# Patient Record
Sex: MALE | Race: WHITE | NOT HISPANIC OR LATINO | Employment: FULL TIME | URBAN - METROPOLITAN AREA
[De-identification: names, ages, dates, MRNs, and addresses within clinical notes are randomized per-mention and may not be internally consistent; named-entity substitution may affect disease eponyms.]

---

## 2022-05-12 ENCOUNTER — APPOINTMENT (EMERGENCY)
Dept: CT IMAGING | Facility: HOSPITAL | Age: 42
End: 2022-05-12
Payer: COMMERCIAL

## 2022-05-12 ENCOUNTER — TELEPHONE (OUTPATIENT)
Dept: UROLOGY | Facility: MEDICAL CENTER | Age: 42
End: 2022-05-12

## 2022-05-12 ENCOUNTER — HOSPITAL ENCOUNTER (EMERGENCY)
Facility: HOSPITAL | Age: 42
Discharge: HOME | End: 2022-05-12
Attending: EMERGENCY MEDICINE
Payer: COMMERCIAL

## 2022-05-12 VITALS
BODY MASS INDEX: 25.92 KG/M2 | OXYGEN SATURATION: 98 % | TEMPERATURE: 98.2 F | RESPIRATION RATE: 18 BRPM | DIASTOLIC BLOOD PRESSURE: 90 MMHG | SYSTOLIC BLOOD PRESSURE: 162 MMHG | HEIGHT: 69 IN | HEART RATE: 72 BPM | WEIGHT: 175 LBS

## 2022-05-12 DIAGNOSIS — R93.89 ABNORMAL CT SCAN: ICD-10-CM

## 2022-05-12 DIAGNOSIS — R10.32 LEFT GROIN PAIN: Primary | ICD-10-CM

## 2022-05-12 LAB
BILIRUB UR QL STRIP: NEGATIVE
CLARITY UR: CLEAR
COLOR UR: YELLOW
GLUCOSE UR STRIP-MCNC: NEGATIVE MG/DL
HGB UR QL STRIP.AUTO: NEGATIVE
KETONES UR STRIP-MCNC: NEGATIVE MG/DL
LEUKOCYTE ESTERASE UR QL STRIP: NEGATIVE
NITRITE UR QL STRIP: NEGATIVE
PH UR STRIP.AUTO: 6 [PH]
PROT UR STRIP-MCNC: NEGATIVE MG/DL
SP GR UR STRIP.AUTO: 1.01 (ref 1–1.03)
UROBILINOGEN UR QL STRIP.AUTO: 0.2 E.U./DL

## 2022-05-12 PROCEDURE — 87491 CHLMYD TRACH DNA AMP PROBE: CPT | Performed by: PHYSICIAN ASSISTANT

## 2022-05-12 PROCEDURE — 93005 ELECTROCARDIOGRAM TRACING: CPT

## 2022-05-12 PROCEDURE — 99284 EMERGENCY DEPT VISIT MOD MDM: CPT

## 2022-05-12 PROCEDURE — 81003 URINALYSIS AUTO W/O SCOPE: CPT | Performed by: PHYSICIAN ASSISTANT

## 2022-05-12 PROCEDURE — G1004 CDSM NDSC: HCPCS

## 2022-05-12 PROCEDURE — 87591 N.GONORRHOEAE DNA AMP PROB: CPT | Performed by: PHYSICIAN ASSISTANT

## 2022-05-12 PROCEDURE — 99282 EMERGENCY DEPT VISIT SF MDM: CPT | Performed by: PHYSICIAN ASSISTANT

## 2022-05-12 PROCEDURE — 74176 CT ABD & PELVIS W/O CONTRAST: CPT

## 2022-05-12 NOTE — TELEPHONE ENCOUNTER
Please Triage -   New Patient- [facility]    What is the reason for the patients appointment? Patient stated he had blood in semen last Thursday and he was in the ER today with left groin pain   He was referred to see Urology  Patient is having discomfort now   Pain is not as strong  Imaging/Lab Results:in epic       Do we accept the patient's insurance or is the patient Self-Pay? Provider & Plan: [place]   Member ID#: Has the patient had any previous urologist(s)?no        Have patient records been requested?in epic        Has the patient had any outside testing done?in [facility]       Does the patient have a personal history of cancer?   No     Patient can be reached at :[phone]

## 2022-05-12 NOTE — ED PROVIDER NOTES
History  Chief Complaint   Patient presents with    Groin Pain     Pt to er with groin pain and swelling that started last week after "vigorous sex" and blood at the meatus  Patient is a 39 y/o M that presents to the ED with left groin pain  He states last week after having sexual intercourse he noticed blood at the urethral meatus  He has not had blood since then, but has had pain in left groin  He states he noticed a bulge there last night and is concerned he might have a hernia  He denies dysuria  Sitting makes the pain worse  No fevers, chills  Denies risk of STI  History provided by:  Patient  Groin Pain  Presenting symptoms: scrotal pain    Presenting symptoms: no dysuria, no penile discharge, no penile pain and no swelling    Context: after intercourse    Relieved by: lying down  Exacerbated by: sitting up  Associated symptoms: abdominal pain, flank pain, groin pain and hematuria    Associated symptoms: no diarrhea, no fever, no genital itching, no genital lesions, no genital rash, no nausea, no penile redness, no penile swelling, no scrotal swelling, no urinary frequency, no urinary hesitation, no urinary incontinence, no urinary retention and no vomiting    Risk factors: no new sexual partner and no STI exposure        None       History reviewed  No pertinent past medical history  History reviewed  No pertinent surgical history  History reviewed  No pertinent family history  I have reviewed and agree with the history as documented  E-Cigarette/Vaping     E-Cigarette/Vaping Substances     Social History     Tobacco Use    Smoking status: Never Smoker    Smokeless tobacco: Never Used   Substance Use Topics    Alcohol use: Never    Drug use: Never       Review of Systems   Constitutional: Negative for chills and fever  Gastrointestinal: Positive for abdominal pain  Negative for blood in stool, diarrhea, nausea and vomiting     Genitourinary: Positive for flank pain and hematuria  Negative for bladder incontinence, decreased urine volume, difficulty urinating, dysuria, frequency, genital sores, hesitancy, penile discharge, penile pain, penile swelling, scrotal swelling and testicular pain  Musculoskeletal: Negative for back pain and neck pain  Skin: Negative for color change, pallor and rash  Neurological: Negative for dizziness, weakness and light-headedness  All other systems reviewed and are negative  Physical Exam  Physical Exam  Vitals and nursing note reviewed  Exam conducted with a chaperone present Wanda LOAIZA)  Constitutional:       General: He is not in acute distress  Appearance: Normal appearance  He is well-developed, well-groomed and normal weight  He is not ill-appearing or diaphoretic  HENT:      Head: Normocephalic and atraumatic  Right Ear: External ear normal       Nose: Nose normal    Eyes:      Conjunctiva/sclera: Conjunctivae normal    Cardiovascular:      Rate and Rhythm: Normal rate and regular rhythm  Heart sounds: Normal heart sounds  Pulmonary:      Effort: Pulmonary effort is normal       Breath sounds: Normal breath sounds  No wheezing, rhonchi or rales  Abdominal:      General: Abdomen is flat  Bowel sounds are normal       Palpations: Abdomen is soft  Tenderness: There is abdominal tenderness in the left lower quadrant  There is no guarding or rebound  Hernia: There is no hernia in the left inguinal area or right inguinal area  Genitourinary:     Pubic Area: No rash  Penis: Normal and circumcised  No erythema, tenderness, discharge, swelling or lesions  Testes: Normal          Right: Tenderness, swelling or testicular hydrocele not present  Left: Tenderness, swelling or testicular hydrocele not present  Epididymis:      Left: Normal    Musculoskeletal:         General: Normal range of motion  Cervical back: Normal range of motion  Right lower leg: No edema        Left lower leg: No edema  Lymphadenopathy:      Lower Body: No right inguinal adenopathy  No left inguinal adenopathy  Skin:     General: Skin is warm and dry  Coloration: Skin is not jaundiced or pale  Findings: No rash  Neurological:      General: No focal deficit present  Mental Status: He is alert and oriented to person, place, and time  Motor: No weakness  Psychiatric:         Mood and Affect: Mood normal          Behavior: Behavior is cooperative  Vital Signs  ED Triage Vitals   Temperature Pulse Respirations Blood Pressure SpO2   05/12/22 0945 05/12/22 0951 05/12/22 0951 05/12/22 0951 05/12/22 0952   98 2 °F (36 8 °C) 72 18 162/90 98 %      Temp Source Heart Rate Source Patient Position - Orthostatic VS BP Location FiO2 (%)   05/12/22 0945 05/12/22 0951 -- -- --   Temporal Monitor         Pain Score       --                  Vitals:    05/12/22 0951   BP: 162/90   Pulse: 72         Visual Acuity      ED Medications  Medications - No data to display    Diagnostic Studies  Results Reviewed     Procedure Component Value Units Date/Time    UA w Reflex to Microscopic w Reflex to Culture [[id number]] Collected: 05/12/22 0951    Lab Status: Final result Specimen: Urine, Clean Catch Updated: 05/12/22 1011     Color, UA Yellow     Clarity, UA Clear     Specific Gravity, UA 1 015     pH, UA 6 0     Leukocytes, UA Negative     Nitrite, UA Negative     Protein, UA Negative mg/dl      Glucose, UA Negative mg/dl      Ketones, UA Negative mg/dl      Urobilinogen, UA 0 2 E U /dl      Bilirubin, UA Negative     Blood, UA Negative    Chlamydia/GC amplified DNA by PCR [[id number]] Collected: 05/12/22 0951    Lab Status: In process Specimen: Urine, Other Updated: 05/12/22 1005                 CT renal stone study abdomen pelvis without contrast   Final Result by Jarrett Polo MD (05/12 1025)      No acute pathology  Specifically, no urinary tract calculi or obstructive uropathy  Workstation performed: [id number]                    Procedures  Procedures         ED Course                                             MDM  Number of Diagnoses or Management Options  Abnormal CT scan: new and requires workup  Left groin pain: new and requires workup  Diagnosis management comments: Patient with left groin pain, no hernia, no redness, no discharge from penis  Will check urine to r/o UTI  Denies risk of STI, GC/chlamydia pending  Patient found to have calcifications in prostate, advised f/u with urology  Patient with sclerosis right acetabulum, advised f/u with PCP for MRI to further evaluate  No concern for epididymitis, no tenderness  Patient instructed to f/u concern for hernia  Amount and/or Complexity of Data Reviewed  Clinical lab tests: ordered and reviewed  Tests in the radiology section of CPT®: ordered and reviewed    Patient Progress  Patient progress: stable      Disposition  Final diagnoses:   Left groin pain   Abnormal CT scan - calcification in prostate, sclerosis right acetabulum  Time reflects when diagnosis was documented in both MDM as applicable and the Disposition within this note     Time User Action Codes Description Comment    5/12/2022 10:43 AM Minesh Up Add [R10 32] Left groin pain     5/12/2022 10:44 AM Minesh Up Add [R93 89] Abnormal CT scan     5/12/2022 10:44 AM Minesh Blander Modify [R93 89] Abnormal CT scan calcification in prostate    5/12/2022 10:44 AM Minesh Blander Modify [R93 89] Abnormal CT scan calcification in prostate, sclerosis right acetabulum  ED Disposition     ED Disposition   Discharge    Condition   Stable    Date/Time   Thu May 12, 2022 10:31 AM    Comment   Donnell Gentle discharge to home/self care                 Follow-up Information     Follow up With Specialties Details Why Contact Info Additional Information    your family doctor   for MRI right hip      [facility] Call  for calcifications in prostate [address] [address] [address] [facility] [id number] [phone] [facility] Leyda Diaz , [facility] [address], [place], [place] [address]          There are no discharge medications for this patient  No discharge procedures on file      PDMP Review     None          ED Provider  Electronically Signed by           Portillo Ramírez PA-C  05/12/22 9431

## 2022-05-12 NOTE — TELEPHONE ENCOUNTER
Called Berny back and explained not to worry of blood in sperm  Can be caused from a broken blood vessel during ejaculation or  May have an infections   Scheduled him for an appointment on 5/23 in [facility]

## 2022-05-13 LAB
ATRIAL RATE: 72 BPM
P AXIS: 2 DEGREES
PR INTERVAL: 140 MS
QRS AXIS: 69 DEGREES
QRSD INTERVAL: 96 MS
QT INTERVAL: 374 MS
QTC INTERVAL: 409 MS
T WAVE AXIS: 45 DEGREES
VENTRICULAR RATE: 72 BPM

## 2022-05-13 PROCEDURE — 93010 ELECTROCARDIOGRAM REPORT: CPT | Performed by: INTERNAL MEDICINE

## 2022-05-14 LAB
C TRACH DNA SPEC QL NAA+PROBE: NEGATIVE
N GONORRHOEA DNA SPEC QL NAA+PROBE: NEGATIVE

## 2022-05-18 ENCOUNTER — OFFICE VISIT (OUTPATIENT)
Dept: FAMILY MEDICINE CLINIC | Facility: CLINIC | Age: 42
End: 2022-05-18
Payer: COMMERCIAL

## 2022-05-18 VITALS
HEART RATE: 74 BPM | WEIGHT: 179 LBS | OXYGEN SATURATION: 97 % | BODY MASS INDEX: 27.13 KG/M2 | SYSTOLIC BLOOD PRESSURE: 134 MMHG | HEIGHT: 68 IN | DIASTOLIC BLOOD PRESSURE: 88 MMHG

## 2022-05-18 DIAGNOSIS — Z00.00 WELLNESS EXAMINATION: ICD-10-CM

## 2022-05-18 DIAGNOSIS — E78.2 MIXED HYPERLIPIDEMIA: Primary | ICD-10-CM

## 2022-05-18 DIAGNOSIS — L30.8 OTHER ECZEMA: ICD-10-CM

## 2022-05-18 PROCEDURE — 99386 PREV VISIT NEW AGE 40-64: CPT | Performed by: FAMILY MEDICINE

## 2022-05-18 PROCEDURE — 3725F SCREEN DEPRESSION PERFORMED: CPT | Performed by: FAMILY MEDICINE

## 2022-05-18 PROCEDURE — 3008F BODY MASS INDEX DOCD: CPT | Performed by: FAMILY MEDICINE

## 2022-05-18 RX ORDER — TRIAMCINOLONE ACETONIDE 5 MG/G
OINTMENT TOPICAL 2 TIMES DAILY
Qty: 30 G | Refills: 5 | Status: SHIPPED | OUTPATIENT
Start: 2022-05-18

## 2022-05-18 NOTE — PROGRESS NOTES
HPI:  Anny Prasad is a 38 y o  male here for his yearly health maintenance exam    There is no problem list on file for this patient  History reviewed  No pertinent past medical history  1  Advanced Directive: n     2  Durable Power of  for Healthcare: n     3  Social History:           Drug and alcohol History: n                  4  Immunizations up to date: y                 Lifestyle:                           Healthy Diet:y                          Alcohol Use:n                          Tobacco Use:y                          Regular exercise:y                          Weight concerns:n                               5  Over the past 2 weeks, how often have you been bothered by the following:              Little interest or pleasure in doing things:n              Felling down, depressed or hopeless:n       No current outpatient medications on file  No current facility-administered medications for this visit  Allergies   Allergen Reactions    Tylenol [Acetaminophen] Anaphylaxis     Immunization History   Administered Date(s) Administered    INFLUENZA 03/06/2017, 11/14/2017, 10/06/2021       Patient Care Team:  Rafal Stacy MD as PCP - General (Family Medicine)    Review of Systems   Constitutional: Negative for fatigue, fever and unexpected weight change  HENT: Negative for congestion, sinus pain and sore throat  Eyes: Negative for visual disturbance  Respiratory: Negative for shortness of breath and wheezing  Cardiovascular: Negative for chest pain and palpitations  Gastrointestinal: Negative for abdominal pain, nausea and vomiting  Musculoskeletal: Negative  Negative for arthralgias and myalgias  Neurological: Negative for syncope, weakness and numbness  Psychiatric/Behavioral: Negative  Negative for confusion, dysphoric mood and suicidal ideas  Physical Exam :  Physical Exam  Constitutional:       Appearance: He is well-developed     HENT:      Right Ear: Ear canal normal  Tympanic membrane is not injected  Left Ear: Ear canal normal  Tympanic membrane is not injected  Nose: Nose normal    Eyes:      General:         Right eye: No discharge  Left eye: No discharge  Conjunctiva/sclera: Conjunctivae normal       Pupils: Pupils are equal, round, and reactive to light  Neck:      Thyroid: No thyromegaly  Cardiovascular:      Rate and Rhythm: Normal rate and regular rhythm  Heart sounds: Normal heart sounds  No murmur heard  Pulmonary:      Effort: Pulmonary effort is normal  No respiratory distress  Breath sounds: Normal breath sounds  No wheezing  Abdominal:      General: Bowel sounds are normal  There is no distension  Palpations: Abdomen is soft  Tenderness: There is no abdominal tenderness  Musculoskeletal:         General: Normal range of motion  Cervical back: Normal range of motion and neck supple  Lymphadenopathy:      Cervical: No cervical adenopathy  Skin:     General: Skin is warm and dry  Neurological:      Mental Status: He is alert and oriented to person, place, and time  He is not disoriented  Sensory: No sensory deficit  Gait: Gait normal       Deep Tendon Reflexes: Reflexes are normal and symmetric  Psychiatric:         Speech: Speech normal          Behavior: Behavior normal          Thought Content: Thought content normal          Judgment: Judgment normal            Assessment and Plan:  1  Mixed hyperlipidemia  Comprehensive metabolic panel    Lipid Panel with Direct LDL reflex    Comprehensive metabolic panel    Lipid Panel with Direct LDL reflex   2   Wellness examination         Health Maintenance Due   Topic Date Due    Hepatitis C Screening  Never done    COVID-19 Vaccine (1) Never done    Pneumococcal Vaccine: Pediatrics (0 to 5 Years) and At-Risk Patients (6 to 59 Years) (1 - PCV) Never done    HIV Screening  Never done    BMI: Followup Plan  Never done   Kyung Barahona DTaP,Tdap,and Td Vaccines (1 - Tdap) Never done

## 2022-05-23 ENCOUNTER — TELEMEDICINE (OUTPATIENT)
Dept: UROLOGY | Facility: HOSPITAL | Age: 42
End: 2022-05-23
Payer: COMMERCIAL

## 2022-05-23 DIAGNOSIS — Z12.5 SCREENING FOR PROSTATE CANCER: ICD-10-CM

## 2022-05-23 DIAGNOSIS — R36.1 HEMATOSPERMIA: Primary | ICD-10-CM

## 2022-05-23 DIAGNOSIS — R10.32 LEFT GROIN PAIN: ICD-10-CM

## 2022-05-23 PROCEDURE — 99442 PR PHYS/QHP TELEPHONE EVALUATION 11-20 MIN: CPT | Performed by: NURSE PRACTITIONER

## 2022-05-23 NOTE — PROGRESS NOTES
05/23/22    Feliz Cordova   1980   [id number]     Virtual Brief Visit    Assessment  1 Hematospermia  2 Left groin pain     Discussion/Plan  1 Hematospermia   2 Left groin pain    NSAIDs, conservative management reviewed   Scrotal/groin US ordered   PSA ordered     Follow up 4 weeks with imaging  All questions answered  Emotional support provided     Subjective  HPI   Feliz Cordova is a 38year old male who presents in consultation for evaluation of hematospermia and left groin discomfort  He reports an isolated incident of bright red blood in his semen 2 weeks ago  He denies symptoms of prostatitis or UTI  He denies relevant surgical history  He denies painful ejaculation although he states his anxiety surrounding the issue was high and he cannot recall if he experienced pain  He is a smoker approximately 1 ppd x 20-25 years  HE had CT imaging for pain which was negative  GC/Chlamydia and urine studies negative  He hydrates with 24 oz water daily  Denies family history of  malignancy  Review of Systems - History obtained from chart review and the patient  General ROS: negative  Psychological ROS: negative  Endocrine ROS: negative  Respiratory ROS: no cough, shortness of breath, or wheezing  Cardiovascular ROS: no chest pain or dyspnea on exertion  Gastrointestinal ROS: no abdominal pain, change in bowel habits, or black or bloody stools  Genito-Urinary ROS: negative  Musculoskeletal ROS: positive for - pain in groin  Neurological ROS: no TIA or stroke symptoms  Dermatological ROS: negative            JOHN Maher     I have spent 15 minutes with Patient  today in which greater than 50% of this time was spent in counseling/coordination of care regarding Diagnostic results, Risks and benefits of tx options and Intructions for management         Patient is located in the following state in which I hold an active license [place]        Recent Visits  Date Type Provider Dept   05/18/22 Office Visit Aracelis Mendiola Lily Sargent MD [facility]   Showing recent visits within past 7 days and meeting all other requirements  Today's Visits  Date Type Provider Dept   05/23/22 Lance JOHN Ferreira [facility] [place]   Showing today's visits and meeting all other requirements  Future Appointments  No visits were found meeting these conditions    Showing future appointments within next 150 days and meeting all other requirements

## 2022-06-02 ENCOUNTER — HOSPITAL ENCOUNTER (OUTPATIENT)
Dept: ULTRASOUND IMAGING | Facility: HOSPITAL | Age: 42
Discharge: HOME | End: 2022-06-02
Payer: COMMERCIAL

## 2022-06-02 DIAGNOSIS — R10.32 LEFT GROIN PAIN: ICD-10-CM

## 2022-06-02 PROCEDURE — 76870 US EXAM SCROTUM: CPT

## 2022-07-22 ENCOUNTER — TELEPHONE (OUTPATIENT)
Dept: UROLOGY | Facility: AMBULATORY SURGERY CENTER | Age: 42
End: 2022-07-22

## 2022-07-22 NOTE — TELEPHONE ENCOUNTER
PSA needs to sent to [address]  He is going Monday to have his labs drawn  Please call pt back to confirm

## 2022-07-22 NOTE — TELEPHONE ENCOUNTER
Spoke with pt's wife Cielo Cotton and advised her of PSA test order for upcoming appt on 7/26/22  She acknowledged they are aware, however with pt's work schedule it has been difficult making time  Advised her he can do it non-fasting  She acknowledged and said they would try to get it done no later than the day before, 7/25/22

## 2022-07-26 ENCOUNTER — OFFICE VISIT (OUTPATIENT)
Dept: UROLOGY | Facility: HOSPITAL | Age: 42
End: 2022-07-26
Payer: COMMERCIAL

## 2022-07-26 VITALS
HEART RATE: 80 BPM | BODY MASS INDEX: 27.52 KG/M2 | DIASTOLIC BLOOD PRESSURE: 70 MMHG | SYSTOLIC BLOOD PRESSURE: 106 MMHG | OXYGEN SATURATION: 96 % | WEIGHT: 181 LBS

## 2022-07-26 DIAGNOSIS — R36.1 HEMATOSPERMIA: ICD-10-CM

## 2022-07-26 DIAGNOSIS — R10.32 LEFT GROIN PAIN: Primary | ICD-10-CM

## 2022-07-26 LAB
ALBUMIN SERPL-MCNC: 5.3 G/DL (ref 4–5)
ALBUMIN/GLOB SERPL: 3.1 {RATIO} (ref 1.2–2.2)
ALP SERPL-CCNC: 65 IU/L (ref 44–121)
ALT SERPL-CCNC: 31 IU/L (ref 0–44)
AST SERPL-CCNC: 23 IU/L (ref 0–40)
BILIRUB SERPL-MCNC: 0.6 MG/DL (ref 0–1.2)
BUN SERPL-MCNC: 15 MG/DL (ref 6–24)
BUN/CREAT SERPL: 16 (ref 9–20)
CALCIUM SERPL-MCNC: 9.8 MG/DL (ref 8.7–10.2)
CHLORIDE SERPL-SCNC: 102 MMOL/L (ref 96–106)
CHOLEST SERPL-MCNC: 261 MG/DL (ref 100–199)
CO2 SERPL-SCNC: 24 MMOL/L (ref 20–29)
CREAT SERPL-MCNC: 0.92 MG/DL (ref 0.76–1.27)
EGFR: 105 ML/MIN/1.73
GLOBULIN SER-MCNC: 1.7 G/DL (ref 1.5–4.5)
GLUCOSE SERPL-MCNC: 97 MG/DL (ref 65–99)
HDLC SERPL-MCNC: 70 MG/DL
LDLC SERPL CALC-MCNC: 168 MG/DL (ref 0–99)
LDLC/HDLC SERPL: 2.4 RATIO (ref 0–3.6)
POTASSIUM SERPL-SCNC: 4.5 MMOL/L (ref 3.5–5.2)
PROT SERPL-MCNC: 7 G/DL (ref 6–8.5)
PSA SERPL-MCNC: 1 NG/ML (ref 0–4)
SL AMB VLDL CHOLESTEROL CALC: 23 MG/DL (ref 5–40)
SODIUM SERPL-SCNC: 138 MMOL/L (ref 134–144)
TRIGL SERPL-MCNC: 132 MG/DL (ref 0–149)

## 2022-07-26 PROCEDURE — 99213 OFFICE O/P EST LOW 20 MIN: CPT | Performed by: NURSE PRACTITIONER

## 2022-07-26 NOTE — PROGRESS NOTES
07/26/22    Jacqui Padilla   1980   [id number]     Assessment  1 Hematospermia  2 Left groin pain     Discussion/Plan  1 Hematospermia   Resolved  2 Left groin pain               NSAIDs, conservative management reviewed              6/2/22 Scrotal/groin US: Left-sided varicocele  Normal testes       We reviewed imaging  Follow up as needed  All questions answered  Subjective  HPI   Jacqui Padilla is a 38year old male who presents in follow up for evaluation of hematospermia and left groin discomfort  His ultrasound showed left sided varicocele  He reports an isolated incident of bright red blood in his semen last month  This has since resolved and not recurred  He denies symptoms of prostatitis or UTI  He denies relevant surgical history  He is a smoker approximately 1 ppd x 20-25 years  He had CT imaging for pain which was negative  GC/Chlamydia and urine studies negative  He hydrates with 24 oz water daily  Denies family history of  malignancy  Component      Latest Ref Rng & Units 7/25/2022          11:01 AM   PSA, Total      0 0 - 4 0 ng/mL 1 0       Review of Systems - History obtained from chart review and the patient  General ROS: negative  Psychological ROS: negative  Cardiovascular ROS: no chest pain or dyspnea on exertion  Gastrointestinal ROS: negative  Genito-Urinary ROS: no dysuria, trouble voiding, or hematuria  Musculoskeletal ROS: negative  Neurological ROS: no TIA or stroke symptoms  Dermatological ROS: negative       Objective  Physical Exam  Vitals and nursing note reviewed  Constitutional:       General: He is awake  He is not in acute distress  Appearance: Normal appearance  He is well-developed, well-groomed and normal weight  He is not ill-appearing, toxic-appearing or diaphoretic  Pulmonary:      Effort: Pulmonary effort is normal    Abdominal:      Tenderness: There is no right CVA tenderness or left CVA tenderness     Musculoskeletal:         General: Normal range of motion  Cervical back: Normal range of motion and neck supple  Skin:     General: Skin is warm  Neurological:      General: No focal deficit present  Mental Status: He is alert and oriented to person, place, and time  Mental status is at baseline  Psychiatric:         Attention and Perception: Attention normal          Mood and Affect: Mood normal          Speech: Speech normal          Behavior: Behavior normal  Behavior is cooperative  Thought Content: Thought content normal          Cognition and Memory: Cognition normal          Judgment: Judgment normal          SCROTAL ULTRASOUND     INDICATION:    R10 32: Left lower quadrant pain      COMPARISON:  CT renal stone study 5/12/2022     TECHNIQUE:   Ultrasound the scrotal contents was performed with a high frequency linear transducer utilizing volumetric sweep imaging as well as standard still image techniques  Imaging performed in longitudinal and transverse orientation  Color and   spectral Doppler evaluation also performed bilaterally      FINDINGS:     TESTES:   Testes are symmetric and normal in size      RIGHT testis = 5 2 x 2 9 x 2 9 cm  Volume 22 9 mL  Normal contour with homogeneous smooth echotexture  No intratesticular mass lesion or calcifications      LEFT testis = 4 4 x 2 7 x 3 4 cm  Volume 21 0 mL  Normal contour with homogeneous smooth echotexture  No intratesticular mass lesion or calcifications      Doppler flow within both testes is present and symmetric      EPIDIDYMIDES:   Normal Size  Doppler ultrasound demonstrates normal blood flow  No epididymal lesions      HYDROCELE:  Trace bilateral hydroceles      VARICOCELE:  Left-sided      SCROTUM:  Scrotal thickness and appearance within normal limits  No evidence for extratesticular mass or hernia demonstrated      IMPRESSION:     Left-sided varicocele      Normal testes         JOHN Martinez     I have spent 15 minutes with Patient  today in which greater than 50% of this time was spent in counseling/coordination of care regarding Diagnostic results

## 2022-07-27 ENCOUNTER — TELEPHONE (OUTPATIENT)
Dept: FAMILY MEDICINE CLINIC | Facility: CLINIC | Age: 42
End: 2022-07-27

## 2022-07-27 DIAGNOSIS — E78.2 MIXED HYPERLIPIDEMIA: Primary | ICD-10-CM

## 2022-07-27 RX ORDER — ROSUVASTATIN CALCIUM 5 MG/1
5 TABLET, COATED ORAL DAILY
Qty: 90 TABLET | Refills: 3 | Status: SHIPPED | OUTPATIENT
Start: 2022-07-27

## 2022-07-27 NOTE — TELEPHONE ENCOUNTER
----- Message from Ton Natarajan MD sent at 7/26/2022  6:27 AM EDT -----  Chol high but good chol high--?  Protective---crestor 5mg is option--appt if wants to discuss